# Patient Record
Sex: FEMALE | Race: WHITE | HISPANIC OR LATINO | Employment: UNEMPLOYED | URBAN - METROPOLITAN AREA
[De-identification: names, ages, dates, MRNs, and addresses within clinical notes are randomized per-mention and may not be internally consistent; named-entity substitution may affect disease eponyms.]

---

## 2019-11-27 ENCOUNTER — OFFICE VISIT (OUTPATIENT)
Dept: OBSTETRICS AND GYNECOLOGY | Facility: CLINIC | Age: 34
End: 2019-11-27
Payer: MEDICAID

## 2019-11-27 VITALS
SYSTOLIC BLOOD PRESSURE: 120 MMHG | BODY MASS INDEX: 32.4 KG/M2 | DIASTOLIC BLOOD PRESSURE: 82 MMHG | WEIGHT: 176.06 LBS | HEIGHT: 62 IN

## 2019-11-27 DIAGNOSIS — N91.2 AMENORRHEA: Primary | ICD-10-CM

## 2019-11-27 LAB
B-HCG UR QL: POSITIVE
CTP QC/QA: YES

## 2019-11-27 PROCEDURE — 87077 CULTURE AEROBIC IDENTIFY: CPT

## 2019-11-27 PROCEDURE — 99204 PR OFFICE/OUTPT VISIT, NEW, LEVL IV, 45-59 MIN: ICD-10-PCS | Mod: S$PBB,,, | Performed by: OBSTETRICS & GYNECOLOGY

## 2019-11-27 PROCEDURE — 99999 PR PBB SHADOW E&M-NEW PATIENT-LVL III: ICD-10-PCS | Mod: PBBFAC,,, | Performed by: OBSTETRICS & GYNECOLOGY

## 2019-11-27 PROCEDURE — 87491 CHLMYD TRACH DNA AMP PROBE: CPT

## 2019-11-27 PROCEDURE — 87086 URINE CULTURE/COLONY COUNT: CPT

## 2019-11-27 PROCEDURE — 87088 URINE BACTERIA CULTURE: CPT

## 2019-11-27 PROCEDURE — 99203 OFFICE O/P NEW LOW 30 MIN: CPT | Mod: PBBFAC,PN | Performed by: OBSTETRICS & GYNECOLOGY

## 2019-11-27 PROCEDURE — 88175 CYTOPATH C/V AUTO FLUID REDO: CPT

## 2019-11-27 PROCEDURE — 99204 OFFICE O/P NEW MOD 45 MIN: CPT | Mod: S$PBB,,, | Performed by: OBSTETRICS & GYNECOLOGY

## 2019-11-27 PROCEDURE — 87186 SC STD MICRODIL/AGAR DIL: CPT

## 2019-11-27 PROCEDURE — 99999 PR PBB SHADOW E&M-NEW PATIENT-LVL III: CPT | Mod: PBBFAC,,, | Performed by: OBSTETRICS & GYNECOLOGY

## 2019-11-27 PROCEDURE — 81025 URINE PREGNANCY TEST: CPT | Mod: PBBFAC,PN | Performed by: OBSTETRICS & GYNECOLOGY

## 2019-11-27 NOTE — PROGRESS NOTES
"  CC: Positive Pregnancy Test    HISTORY OF PRESENT ILLNESS:    Ivana Yanez is a 34 y.o. female, ,  Presents today for a routine exam complaining of amenorrhea and positive home urine pregnancy test.  Patient's last menstrual period was 2019.   She is not currently on any contraception.  Reports nausea. Reports breast tenderness. Denies vaginal bleeding and pelvic pain. Had an IUD placed pp after last baby.    3 term deliveries  1. C/S for AODescent. Gestational Diabetes (controlled on metformin)  2. Failed TOLAC. Gestational Diabetes (controlled on metformin)  3. RLTCS. IUD placement pp. Gestational diabetes (controlled on metformin)      ROS:  GENERAL: No weight changes. No swelling. No fatigue. No fever.  CARDIOVASCULAR: No chest pain. No shortness of breath. No leg cramps.   NEUROLOGICAL: No headaches. No vision changes.  BREASTS: No pain. No lumps. No discharge.  ABDOMEN: No pain. No diarrhea. No constipation.  REPRODUCTIVE: No abnormal bleeding.   VULVA: No pain. No lesions. No itching.  VAGINA: No relaxation. No itching. No odor. No discharge. No lesions.  URINARY: No incontinence. No nocturia. No frequency. No dysuria.    MEDICATIONS AND ALLERGIES:  Reviewed        COMPREHENSIVE GYN HISTORY:  PAP History: Denies abnormal Paps.  Infection History: Denies STDs. Denies PID.  Benign History: Denies uterine fibroids. Denies ovarian cysts. Denies endometriosis. Denies other conditions.  Cancer History: Denies cervical cancer. Denies uterine cancer or hyperplasia. Denies ovarian cancer. Denies vulvar cancer or pre-cancer. Denies vaginal cancer or pre-cancer. Denies breast cancer. Denies colon cancer.  Sexual Activity History: Reports currently being sexually active  Menstrual History: None.  Contraception: None    /82   Ht 5' 2" (1.575 m)   Wt 79.9 kg (176 lb 0.6 oz)   LMP 2019   Breastfeeding? Unknown   BMI 32.20 kg/m²     PE:  AFFECT: Calm, alert and oriented X 3. Interactive during " exam  GENERAL: Appears well-nourished, well-developed, in no acute distress.  HEAD: Normocephalic, atruamatic  TEETH: Good dentition.  THYROID: No thyromegally   BREASTS: No masses, skin changes, nipple discharge or adenopathy bilaterally.  SKIN: Normal for race, warm, & dry. No lesions or rashes.  LUNGS: Easy and unlabored, clear to auscultation bilaterally.  HEART: Regular rate and rhythm   ABDOMEN: Soft and nontender without masses or organomegally.  VULVA: No lesions, masses or tenderness.  VAGINA: Moist and well rugated without lesions or discharge.  CERVIX: Moist and pink without lesions, discharge or tenderness.      UTERUS SIZE: 12 week size, nontender and without masses.  ADNEXA: No masses or tenderness.  ESTIMATE OF PELVIC CAPACITY: Adequate  EXTREMITIES: No cyanosis, clubbing or edema. No calf tenderness.  LYMPH NODES: No axillary or inguinal adenopathy.    PROCEDURES:  UPT Positive  Genprobe  Pap      ASSESSMENT/PLAN:  Amenorrhea  Positive urine pregnancy test (JESSIE: 2020, EGA: 14w based on LMP)    -  Routine prenatal care    Nausea and vomiting in pregnancy    -  Education regarding lifestyle and dietary modifications    -  Advised use of B6/Unisom. Pt will notify us if no relief/worsening symptoms, will consider Zofran if needed.      1st TRIMESTER COUNSELING: Discussed all, booklet provided:  Common complaints of pregnancy  HIV and other routine prenatal tests including  genetic screening  Risk factors identified by prenatal history  Oriented to practice - discussed anticipated course of prenatal care & indications for Ultrasound  Childbirth classes/Hospital facilities   Nutrition and weight gain counseling  Toxoplasmosis precautions (Cats/Raw Meat)  Sexual activity and exercise  Environmental/Work hazards  Travel  Tobacco (Ask, Advise, Assess, Assist, and Arrange), as well as alcohol and drug use  Use of any medications (Including supplements, Vitamins, Herbs, or OTC Drugs)  Domestic  violence  Seat belt use      TERATOLOGY COUNSELING:   Discussed indications and options for aneuploidy screening - pamphlets given    - Candidate for [id number]. Will await dating ultrasound  Dating US scheduled  FOLLOW-UP in 4 weeks with Dr. Juan C Hartmann MD    OB/GYN

## 2019-11-29 LAB
BACTERIA UR CULT: ABNORMAL
C TRACH DNA SPEC QL NAA+PROBE: NOT DETECTED
N GONORRHOEA DNA SPEC QL NAA+PROBE: NOT DETECTED

## 2019-12-02 ENCOUNTER — TELEPHONE (OUTPATIENT)
Dept: OBSTETRICS AND GYNECOLOGY | Facility: CLINIC | Age: 34
End: 2019-12-02

## 2019-12-02 RX ORDER — NITROFURANTOIN 25; 75 MG/1; MG/1
100 CAPSULE ORAL 2 TIMES DAILY
Qty: 10 CAPSULE | Refills: 0 | Status: SHIPPED | OUTPATIENT
Start: 2019-12-02 | End: 2019-12-07

## 2019-12-02 NOTE — TELEPHONE ENCOUNTER
----- Message from Duncan Hartmann MD sent at 12/2/2019 10:01 AM CST -----  + UTI. Sensitive to macrobid. Rx sent to pharmacy. Please notify patient.

## 2019-12-03 ENCOUNTER — INITIAL CONSULT (OUTPATIENT)
Dept: MATERNAL FETAL MEDICINE | Facility: CLINIC | Age: 34
End: 2019-12-03
Payer: MEDICAID

## 2019-12-03 ENCOUNTER — PROCEDURE VISIT (OUTPATIENT)
Dept: OBSTETRICS AND GYNECOLOGY | Facility: CLINIC | Age: 34
End: 2019-12-03
Payer: MEDICAID

## 2019-12-03 VITALS
SYSTOLIC BLOOD PRESSURE: 118 MMHG | DIASTOLIC BLOOD PRESSURE: 84 MMHG | WEIGHT: 176.81 LBS | BODY MASS INDEX: 32.34 KG/M2

## 2019-12-03 DIAGNOSIS — N91.2 AMENORRHEA: ICD-10-CM

## 2019-12-03 DIAGNOSIS — O26.879 CERVIX, SHORT (AFFECTING PREGNANCY): Primary | ICD-10-CM

## 2019-12-03 PROCEDURE — 99999 PR PBB SHADOW E&M-EST. PATIENT-LVL III: CPT | Mod: PBBFAC,,, | Performed by: PEDIATRICS

## 2019-12-03 PROCEDURE — 76817 TRANSVAGINAL US OBSTETRIC: CPT | Mod: 26,S$PBB,, | Performed by: PEDIATRICS

## 2019-12-03 PROCEDURE — 99205 OFFICE O/P NEW HI 60 MIN: CPT | Mod: S$PBB,TH,25, | Performed by: PEDIATRICS

## 2019-12-03 PROCEDURE — 99205 PR OFFICE/OUTPT VISIT, NEW, LEVL V, 60-74 MIN: ICD-10-PCS | Mod: S$PBB,TH,25, | Performed by: PEDIATRICS

## 2019-12-03 PROCEDURE — 76801 OB US < 14 WKS SINGLE FETUS: CPT | Mod: 26,S$PBB,, | Performed by: PEDIATRICS

## 2019-12-03 PROCEDURE — 99999 PR PBB SHADOW E&M-EST. PATIENT-LVL III: ICD-10-PCS | Mod: PBBFAC,,, | Performed by: PEDIATRICS

## 2019-12-03 PROCEDURE — 76801 OB US < 14 WKS SINGLE FETUS: CPT | Mod: PBBFAC | Performed by: PEDIATRICS

## 2019-12-03 PROCEDURE — 76801 PR US, OB <14WKS, TRANSABD, SINGLE GESTATION: ICD-10-PCS | Mod: 26,S$PBB,, | Performed by: PEDIATRICS

## 2019-12-03 PROCEDURE — 99213 OFFICE O/P EST LOW 20 MIN: CPT | Mod: PBBFAC,TH,25 | Performed by: PEDIATRICS

## 2019-12-03 PROCEDURE — 76817 PR US, OB, TRANSVAG APPROACH: ICD-10-PCS | Mod: 26,S$PBB,, | Performed by: PEDIATRICS

## 2019-12-03 PROCEDURE — 76817 TRANSVAGINAL US OBSTETRIC: CPT | Mod: PBBFAC | Performed by: PEDIATRICS

## 2019-12-03 NOTE — LETTER
December 9, 2019      MIKE Irizarry MD  [address]  [address]  [place] [address]           [facility]  [address]  [facility] [address]  Phone: [phone]          Patient: Ivana Juan   MR Number: [id number]   YOB: 1985   Date of Visit: 12/3/2019       Dear Dr. MIKE Irizarry:    Thank you for referring Ivana Juan to me for evaluation. Attached you will find relevant portions of my assessment and plan of care.    If you have questions, please do not hesitate to call me. I look forward to following Ivana Juan along with you.    Sincerely,    Chana Littlejohn MD    Enclosure  CC:  No Recipients    If you would like to receive this communication electronically, please contact [email or web address] or [phone] to request more information on [facility] [employer] access.    For providers and/or their staff who would like to refer a patient to [facility], please contact us through our one-stop-shop provider referral line, [facility], at [phone].    If you feel you have received this communication in error or would no longer like to receive these types of communications, please e-mail [email or web address]

## 2019-12-03 NOTE — PROGRESS NOTES
Cerclage instructions reviewed with pt. NPO after midnight, procedure at 8:30 AM so arrive 7:30 AM to 6th floor L&D, do not come alone so that someone will be available to drive her home, Pt verbalized understanding of information.

## 2019-12-04 DIAGNOSIS — Z36.89 ENCOUNTER FOR ULTRASOUND TO CHECK FETAL GROWTH: Primary | ICD-10-CM

## 2019-12-10 NOTE — PROGRESS NOTES
Indication  ========    Estimation of gestational age, IUD    Method  ======    Transabdominal ultrasound examination. Transvaginal ultrasound examination, 2D Color Doppler, Voluson S8. View: Suboptimal view: limited  by maternal body habitus    Pregnancy  =========    Mireles pregnancy. Number of fetuses: 1    Dating  ======    LMP on: 8/21/2019  GA by LMP 14 w + 6 d  JESSIE by LMP: 5/27/2020  Ultrasound examination on: 12/3/2019  GA by U/S based upon: AC, BPD, Femur, HC  GA by U/S 14 w + 6 d  JESSIE by U/S: 5/27/2020  Assigned: based on ultrasound (AC, BPD, Femur, HC), selected on 12/3/2019  Assigned GA 14 w + 6 d  Assigned JESSIE: 5/27/2020  Pregnancy length 280 d    General Evaluation  ==============    Cardiac activity present.  bpm.  Fetal movements visualized.  Umbilical cord 3 vessel cord.  Amniotic fluid Amount of AF: normal amount.    Fetal Biometry  ============    Standard  BPD 30.7 mm  15w 5d                Hadlock    OFD 38.5 mm  15w 4d                Irina    .2 mm  15w 3d                Hadlock    AC 84.7 mm  14w 5d                Hadlock    Femur 12.5 mm  13w 5d                Hadlock    HC / AC 1.32    EFW 96 g    EFW (lb) 0 lb  EFW (oz) 3 oz  EFW by: Hadlock (BPD-HC-AC-FL)  Head / Face / Neck  Cephalic index 0.80    Extremities / Bony Struc  FL / BPD 0.41    FL / HC 0.11    FL / AC 0.15    Other Structures   bpm    Fetal Anatomy  ============    Cranium: appears normal  Stomach: normal  Kidneys: suboptimal  Bladder: normal  Arms: both visualized  Legs: both visualized  Rt foot: suboptimal  Lt foot: suboptimal    Maternal Structures  ===============    Uterus / Cervix  Uterus: Normal  Ovaries / Tubes / Adnexa  Rt ovary: Normal  Rt ovary D1 45 mm  Rt ovary D2 31 mm  Rt ovary D3 19 mm  Rt ovary Vol 14.0 cm³  Rt ovarian corpus luteum D1 22.0 mm  Rt ovarian corpus luteum D2 17.0 mm  Rt ovarian corpus luteum D3 20.0 mm  Lt ovary: Normal  Lt ovary D1 19 mm  Lt ovary D2 28 mm  Lt ovary D3 18  mm  Lt ovary Vol 5.2 cm³    Consultation  ==========    Ivana BENITA Juan is a 34-year-old  4 para 3003 female at 14 weeks' 5 days' who was in outside ultrasound clinic for dating. On  that ultrasound, there was concern about a short cervix, pregnancy location, and placental implantation/vascularity. She was sent to our main  obstetrical ultrasound unit for consultation and review. She has a history of three prior term  deliveries. She had a cerclage for second  pregnancy after LEEP. She states that she had a shortened cervix at last pregnancy but declined cerclage and delivered at term.    This IUP is complicated by shortened cervix, placenta previa, h/o  section and h/o gestational diabetes in prior pregnancies. On US  here, the pregnancy implantation appears to be low with a complete previa and abnormal vascularity, implantation posteriorly that is suspicious  for accreda spectrum. There is a large JORDAN in fundus of uterus.    OB History    #  4 Current  3 Term   39w0d       CS-LTranv  2 Term   39w0d       CS-LTranv  1 Term   39w0d       CS-LTranv          Past Medical History:        PSHx: 3 prior LTCS as above    1. Shortened cervix  - 15 mm cervix on US in clinic  - h/o shortened cervix in 2nd pregnancy after diagnosis of shortened cervix; patient had recent cone biopsy prior to this pregnancy  - she then had a term 3rd pregnancy with  section  - she has been experiencing some bleeding this pregnancy; none today  - she was told yesterday she needed a cerclage by MFM at US    2. Complete previa  - diagnosed on US 12/3  - > 50% risk of accreta given h/o three prior  sections  - no bleeding today    3. H/o  section  - primary  section for unknown indicatino  - failed TOLAC in second pregnancy  - RLTCS in third pregnancy    4. Gestational diabetes  - h/o gestational diabetes in all prior pregnancies  - denies issues outside of  pregnancy                          OBHx:    4 Current  3 Term   39w0d    LTCS  2 Term   39w0d    LTCS LEEP, Cerclage  1 Term   39w0d    LTCS    Shortened cervix, no intervention              Assessment and Plan:  1. Shortened cervix  5 mm cervix on US in clinic  - h/o shortened cervix in 2nd pregnancy after diagnosis of shortened cervix; patient had recent cone biopsy prior to this pregnancy  - she then had a term 3rd pregnancy with  section  - she has been experiencing some bleeding this pregnancy; none today  - she was told yesterday she needed a cerclage by MFM at US    2. Complete previa  - diagnosed on US 12/3  - > 50% risk of accreta given h/o three prior  sections  - no bleeding today    3. H/o  section  - primary  section for unknown indicat  - failed TOLAC in second pregnancy  - RLTCS in third pregnancy    4. Gestational diabetes  - h/o gestational diabetes in all prior pregnancies  - denies issues outside of pregnancy        I spent 60 minutes in patient care management and consultation with >50%face to face with assistance of 2 separate MARTMARYAM interpreters.      Impression  =========    VIable fetus.  Implantation appears low with complete previa.  Large superior JORDAN noted.  Posterior implantation appears vascular and consistent with possible accreta spectrum.

## 2019-12-12 LAB
FINAL PATHOLOGIC DIAGNOSIS: NORMAL
Lab: NORMAL

## 2020-01-07 ENCOUNTER — TELEPHONE (OUTPATIENT)
Dept: MATERNAL FETAL MEDICINE | Facility: CLINIC | Age: 35
End: 2020-01-07

## 2020-01-07 NOTE — TELEPHONE ENCOUNTER
Called pt to find out if she was going to continue seeing us as OB. And to schedule followup Ob appt.   No answer.. Left message for pt to return our call at office